# Patient Record
Sex: FEMALE | Race: WHITE | NOT HISPANIC OR LATINO | URBAN - METROPOLITAN AREA
[De-identification: names, ages, dates, MRNs, and addresses within clinical notes are randomized per-mention and may not be internally consistent; named-entity substitution may affect disease eponyms.]

---

## 2022-12-01 ENCOUNTER — EMERGENCY (EMERGENCY)
Facility: HOSPITAL | Age: 67
LOS: 0 days | Discharge: LEFT BEFORE DISCHARGE | End: 2022-12-01
Attending: EMERGENCY MEDICINE | Admitting: EMERGENCY MEDICINE

## 2022-12-01 VITALS
RESPIRATION RATE: 16 BRPM | WEIGHT: 145.06 LBS | SYSTOLIC BLOOD PRESSURE: 134 MMHG | TEMPERATURE: 97 F | DIASTOLIC BLOOD PRESSURE: 74 MMHG | HEART RATE: 110 BPM | OXYGEN SATURATION: 100 %

## 2022-12-01 DIAGNOSIS — R55 SYNCOPE AND COLLAPSE: ICD-10-CM
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
## 2022-12-01 DIAGNOSIS — Z53.29 PROCEDURE AND TREATMENT NOT CARRIED OUT BECAUSE OF PATIENT'S DECISION FOR OTHER REASONS: ICD-10-CM

## 2022-12-01 DIAGNOSIS — M25.561 PAIN IN RIGHT KNEE: ICD-10-CM
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
## 2022-12-01 DIAGNOSIS — S05.11XA CONTUSION OF EYEBALL AND ORBITAL TISSUES, RIGHT EYE, INITIAL ENCOUNTER: ICD-10-CM

## 2022-12-01 DIAGNOSIS — S02.2XXA FRACTURE OF NASAL BONES, INITIAL ENCOUNTER FOR CLOSED FRACTURE: ICD-10-CM

## 2022-12-01 DIAGNOSIS — J34.89 OTHER SPECIFIED DISORDERS OF NOSE AND NASAL SINUSES: ICD-10-CM

## 2022-12-01 DIAGNOSIS — R91.1 SOLITARY PULMONARY NODULE: ICD-10-CM

## 2022-12-01 LAB
ALBUMIN SERPL ELPH-MCNC: 4.5 G/DL — SIGNIFICANT CHANGE UP (ref 3.5–5.2)
ALP SERPL-CCNC: 73 U/L — SIGNIFICANT CHANGE UP (ref 30–115)
ALT FLD-CCNC: 25 U/L — SIGNIFICANT CHANGE UP (ref 0–41)
ANION GAP SERPL CALC-SCNC: 14 MMOL/L — SIGNIFICANT CHANGE UP (ref 7–14)
AST SERPL-CCNC: 27 U/L — SIGNIFICANT CHANGE UP (ref 0–41)
BASOPHILS # BLD AUTO: 0.03 K/UL — SIGNIFICANT CHANGE UP (ref 0–0.2)
BASOPHILS NFR BLD AUTO: 0.4 % — SIGNIFICANT CHANGE UP (ref 0–1)
BILIRUB SERPL-MCNC: 0.3 MG/DL — SIGNIFICANT CHANGE UP (ref 0.2–1.2)
BUN SERPL-MCNC: 17 MG/DL — SIGNIFICANT CHANGE UP (ref 10–20)
CALCIUM SERPL-MCNC: 9 MG/DL — SIGNIFICANT CHANGE UP (ref 8.4–10.5)
CHLORIDE SERPL-SCNC: 102 MMOL/L — SIGNIFICANT CHANGE UP (ref 98–110)
CO2 SERPL-SCNC: 24 MMOL/L — SIGNIFICANT CHANGE UP (ref 17–32)
CREAT SERPL-MCNC: 0.7 MG/DL — SIGNIFICANT CHANGE UP (ref 0.7–1.5)
EGFR: 95 ML/MIN/1.73M2 — SIGNIFICANT CHANGE UP
EOSINOPHIL # BLD AUTO: 0 K/UL — SIGNIFICANT CHANGE UP (ref 0–0.7)
EOSINOPHIL NFR BLD AUTO: 0 % — SIGNIFICANT CHANGE UP (ref 0–8)
GLUCOSE SERPL-MCNC: 113 MG/DL — HIGH (ref 70–99)
HCT VFR BLD CALC: 39.8 % — SIGNIFICANT CHANGE UP (ref 37–47)
HGB BLD-MCNC: 12.9 G/DL — SIGNIFICANT CHANGE UP (ref 12–16)
IMM GRANULOCYTES NFR BLD AUTO: 0.1 % — SIGNIFICANT CHANGE UP (ref 0.1–0.3)
LYMPHOCYTES # BLD AUTO: 0.84 K/UL — LOW (ref 1.2–3.4)
LYMPHOCYTES # BLD AUTO: 11.1 % — LOW (ref 20.5–51.1)
MCHC RBC-ENTMCNC: 28.2 PG — SIGNIFICANT CHANGE UP (ref 27–31)
MCHC RBC-ENTMCNC: 32.4 G/DL — SIGNIFICANT CHANGE UP (ref 32–37)
MCV RBC AUTO: 87.1 FL — SIGNIFICANT CHANGE UP (ref 81–99)
MONOCYTES # BLD AUTO: 0.6 K/UL — SIGNIFICANT CHANGE UP (ref 0.1–0.6)
MONOCYTES NFR BLD AUTO: 7.9 % — SIGNIFICANT CHANGE UP (ref 1.7–9.3)
NEUTROPHILS # BLD AUTO: 6.1 K/UL — SIGNIFICANT CHANGE UP (ref 1.4–6.5)
NEUTROPHILS NFR BLD AUTO: 80.5 % — HIGH (ref 42.2–75.2)
NRBC # BLD: 0 /100 WBCS — SIGNIFICANT CHANGE UP (ref 0–0)
NT-PROBNP SERPL-SCNC: 176 PG/ML — SIGNIFICANT CHANGE UP (ref 0–300)
PLATELET # BLD AUTO: 209 K/UL — SIGNIFICANT CHANGE UP (ref 130–400)
POTASSIUM SERPL-MCNC: 4.3 MMOL/L — SIGNIFICANT CHANGE UP (ref 3.5–5)
POTASSIUM SERPL-SCNC: 4.3 MMOL/L — SIGNIFICANT CHANGE UP (ref 3.5–5)
PROT SERPL-MCNC: 7.3 G/DL — SIGNIFICANT CHANGE UP (ref 6–8)
RBC # BLD: 4.57 M/UL — SIGNIFICANT CHANGE UP (ref 4.2–5.4)
RBC # FLD: 13.7 % — SIGNIFICANT CHANGE UP (ref 11.5–14.5)
SODIUM SERPL-SCNC: 140 MMOL/L — SIGNIFICANT CHANGE UP (ref 135–146)
TROPONIN T SERPL-MCNC: <0.01 NG/ML — SIGNIFICANT CHANGE UP
WBC # BLD: 7.58 K/UL — SIGNIFICANT CHANGE UP (ref 4.8–10.8)
WBC # FLD AUTO: 7.58 K/UL — SIGNIFICANT CHANGE UP (ref 4.8–10.8)

## 2022-12-01 PROCEDURE — 93010 ELECTROCARDIOGRAM REPORT: CPT

## 2022-12-01 PROCEDURE — 70486 CT MAXILLOFACIAL W/O DYE: CPT | Mod: 26,MA

## 2022-12-01 PROCEDURE — 71045 X-RAY EXAM CHEST 1 VIEW: CPT | Mod: 26

## 2022-12-01 PROCEDURE — 99285 EMERGENCY DEPT VISIT HI MDM: CPT | Mod: FS

## 2022-12-01 PROCEDURE — 73560 X-RAY EXAM OF KNEE 1 OR 2: CPT | Mod: 26,50

## 2022-12-01 PROCEDURE — 70450 CT HEAD/BRAIN W/O DYE: CPT | Mod: 26,MA

## 2022-12-01 PROCEDURE — 72125 CT NECK SPINE W/O DYE: CPT | Mod: 26,MA

## 2022-12-01 NOTE — ED PROVIDER NOTE - NS ED ATTENDING STATEMENT MOD
This was a shared visit with the J[facility]. I reviewed and verified the documentation and independently performed the documented:

## 2022-12-01 NOTE — ED PROVIDER NOTE - CLINICAL SUMMARY MEDICAL DECISION MAKING FREE TEXT BOX
Patient presents after a syncopal episode this morning. Sent by urgent care for evaluation. labs, ekg, xray, ct done. + nasal fx. + pulmonary nodule. Results discussed with patient. Patient advised to stay for further evaluation of syncope, does not want to stay at this time. Risks discussed, patient signed out ama. Return precautions discussed. Understands to follow up with ENT and pmd.

## 2022-12-01 NOTE — ED PROVIDER NOTE - NSFOLLOWUPINSTRUCTIONS_ED_ALL_ED_FT
Please follow up with cardiology and ENT in 1-3 days     Our Emergency Department Referral Coordinators will be reaching out to you in the next 24-48 hours from 9:00am to 5:00pm with a follow up appointment. Please expect a phone call from the hospital in that time frame. If you do not receive a call or if you have any questions or concerns, you can reach them at [phone] or [phone].       Nasal Fracture      A nasal fracture is a break or crack in the bones of the nose or the tissue that helps to form the nose (cartilage). Minor breaks do not require treatment and usually heal on their own after about one month. Serious breaks may require treatment that could include surgery.      What are the causes?    A nasal fracture is usually caused by the strong force of a direct hit to the nose (blunt injury). This type of injury often occurs from:  •Playing a contact sport.      •Being involved in a car accident.      •Falling.      •Getting punched in the face.        What are the signs or symptoms?    Symptoms of this condition include:  •Pain.      •Swelling of the nose.      •Bleeding from the nose.      •Bruising around the nose or bruising around the eyes (black eyes).      •Crooked appearance of the nose.        How is this diagnosed?    This condition may be diagnosed based on a physical exam. During the exam, the health care provider will:  •Gently feel the nose for signs of broken bones.      •Look inside the nostrils to check if there is a blood-filled swelling on the dividing wall between the nostrils (septal hematoma).      An X-ray of the nose may be taken. Sometimes, an X-ray may not show a nasal fracture even when one is present. In some cases, X-rays or a CT scan may be taken again 1–5 days later after the swelling has gone down.      How is this treated?    Treatment for this condition depends on the severity of the injury.  •Minor fractures that have not caused deformity often do not require treatment. They may heal on their own.    •For more serious fractures that have caused bones to move out of position, treatment may involve one of the following:  •Repositioning the bones without surgery. The health care provider may be able to do this in his or her office after you are given medicine to numb the nasal area (local anesthetic).      •Surgery. If surgery is needed, it will be done after the swelling is gone. Surgery will stabilize and align the fracture.          Follow these instructions at home:      Bag of ice on a towel on the skin.        The correct and incorrect way to hold your head and fingers for first aid during a nosebleed.     Activity     •Return to your normal activities as told by your health care provider. Ask your health care provider what activities are safe for you.      • Do not play contact sports for 3–4 weeks or as told by your health care provider.      Managing pain and swelling     If directed, put ice on the injured area. To do this:  •Put ice in a plastic bag.      •Place a towel between your skin and the bag.      •Leave the ice on for 20 minutes, 2–3 times a day.      •Take off the ice if your skin turns bright red. This is very important. If you cannot feel pain, heat, or cold, you have a greater risk of damage to the area.      General instructions    •Take over-the-counter and prescription medicines only as told by your health care provider.      •If your nose starts to bleed, sit in an upright position while you squeeze the soft parts of your nose against the dividing wall between your nostrils (septum) for 10 minutes.      •Try to avoid blowing your nose.      •Keep all follow-up visits. This is important.        Contact a health care provider if:    •Your pain increases or becomes severe.      •You continue to have nosebleeds.      •The shape of your nose does not return to normal within 5 days.      •You have pus draining out of your nose.        Get help right away if:    •You have bleeding from your nose that does not stop after you pinch your nostrils closed for 20 minutes and keep ice on your nose.      •You have clear fluid draining out of your nose.      •You notice swelling near the septum inside the nose. This swelling is a septal hematoma that must be drained to help prevent infection.      •You have difficulty moving your eyes.      •You have repeated vomiting.      These symptoms may be an emergency. Get help right away. Call 911.   • Do not wait to see if the symptoms will go away.       • Do not drive yourself to the hospital.         Summary    •A nasal fracture is a break or crack in the bones or cartilage of the nose.      •The fracture is usually caused by a blunt injury to the nose.      •Symptoms include pain, swelling, and facial bruising.      •Nasal fractures may heal on their own, or your health care provider may need to move the bones back into proper position. In some cases, surgery may be needed.

## 2022-12-01 NOTE — ED PROVIDER NOTE - PATIENT PORTAL LINK FT
You can access the FollowMyHealth Patient Portal offered by [facility] by registering at the following website: [email or web address]. By joining [email or web address]’s FollowMyHealth portal, you will also be able to view your health information using other applications (apps) compatible with our system.

## 2022-12-01 NOTE — ED ADULT NURSE NOTE - OBJECTIVE STATEMENT
66 y/o female presents to ED s/p syncopal episode after getting out of bed this morning. pt c/o pain and small lac to nose upon arrival

## 2022-12-01 NOTE — ED PROVIDER NOTE - NS ED ROS FT
Constitutional: (-) fever, (-) chills  Eyes: (-) visual changes  ENT: (-) nasal congestions, (+) nose pain   Cardiovascular: (-) chest pain, (-) syncope  Respiratory: (-) cough, (-) shortness of breath, (-) dyspnea,   Gastrointestinal: (-) vomiting, (-) diarrhea, (-)nausea,  Musculoskeletal: (-) neck pain, (-) back pain, (-) joint pain,  Integumentary: (-) rash, (-) edema, (-) bruises  Neurological: (-) headache, (-) loc, (-) dizziness, (-) tingling, (-)numbness,  Peripheral Vascular: (-) leg swelling  :  (-)dysuria,  (-) hematuria  Allergic/Immunologic: (-) pruritus

## 2022-12-01 NOTE — ED PROVIDER NOTE - CARE PROVIDER_API CALL
Ashutosh Hollingsworth)  Otolaryngology  [address] [place] [address], [address] [place] [address]  Phone: [phone]  Fax: [phone]  Follow Up Time:

## 2022-12-01 NOTE — ED PROVIDER NOTE - PHYSICAL EXAMINATION
VITAL SIGNS: I have reviewed nursing notes and confirm.  CONSTITUTIONAL: in no acute distress.  SKIN: Skin exam is warm and dry, no acute rash.  HEAD: Normocephalic; swelling and erythema to bridge of nose, ecchymosis to right eye  EYES: PERRL, EOM intact; conjunctiva and sclera clear.  ENT: No nasal discharge; airway clear.   NECK: Supple; non tender.  CARD: S1, S2 normal; no murmurs, gallops, or rubs. Regular rate and rhythm.  RESP: No wheezes, rales or rhonchi. Speaking in full sentences.   ABD: Normal bowel sounds; soft; non-distended; non-tender; No rebound or guarding. No CVA tenderness.  EXT: Normal ROM. No clubbing, cyanosis or edema.  NEURO: Alert, oriented. Grossly unremarkable. No focal deficits. steady gait   PSYCH: Cooperative, appropriate.

## 2022-12-01 NOTE — ED ADULT TRIAGE NOTE - CHIEF COMPLAINT QUOTE
pt states she had an episode of syncope this morning after getting out of bed. pt reports she walked downstairs to her kitchen, and only remembers waking up on the floor. pt c/o pain and laceration to the bridge of her nose. denies A/C use

## 2022-12-01 NOTE — ED PROVIDER NOTE - OBJECTIVE STATEMENT
67-year-old female no history presents to ED for syncopal episode around 6 AM this morning.  Patient states she got up out of bed and went outside to kitchen where she blacked out.  Patient states she woke up with some pain to her nose right knee and right eye.  She had 1 episode like this about 12 years ago.  Patient has had no recent cardiac work-up.  Patient came to ED because family forced her to go.  Denies chest pain, SOB, back pain, neck pain, N, V, blurry vision, headache, numbness tingling to extremities

## 2022-12-01 NOTE — ED PROVIDER NOTE - ATTENDING APP SHARED VISIT CONTRIBUTION OF CARE
66 yo F no pmh presents after a syncopal episode. States that this morning around 6am went to her kitchen. States that she remembers turning on the light then remembers being on the floor. Fell forward onto her face and right knee. + epistaxis at that time resolved. Now having some facial pain and swelling and right knee pain and swelling. no headache, no dizziness, no ac, no n/v. no chest pain, no shortness of breath, no palpitations. no abdominal pain. Similar episode 12 year ago. Does report recent URI and worsening cough, no fevers.     CONSTITUTIONAL: Well-developed; well-nourished; in no acute distress.   SKIN: warm, dry  HEAD: Normocephalic; atraumatic.  EYES: PERRL, EOMI, no conjunctival erythema  ENT: No nasal discharge; airway clear. no septal hematoma. + edema and tenderness to the nasal bridge. no active epistaxis.   NECK: Supple; non tender.  CARD: S1, S2 normal;  Regular rate and rhythm.   RESP: No wheezes, rales or rhonchi.  ABD: soft non tender, non distended, no rebound or guarding  EXT: Normal ROM.  5/5 strength in all 4 extremities. + edema and tenderness to the anterior right knee.   LYMPH: No acute cervical adenopathy.  NEURO: Alert, oriented, grossly unremarkable. neurovascularly intact  PSYCH: Cooperative, appropriate.

## 2022-12-03 ENCOUNTER — EMERGENCY (EMERGENCY)
Facility: HOSPITAL | Age: 67
LOS: 0 days | Discharge: HOME | End: 2022-12-03
Attending: EMERGENCY MEDICINE | Admitting: EMERGENCY MEDICINE

## 2022-12-03 VITALS
OXYGEN SATURATION: 96 % | HEART RATE: 84 BPM | SYSTOLIC BLOOD PRESSURE: 156 MMHG | TEMPERATURE: 98 F | RESPIRATION RATE: 16 BRPM | DIASTOLIC BLOOD PRESSURE: 72 MMHG

## 2022-12-03 DIAGNOSIS — R42 DIZZINESS AND GIDDINESS: ICD-10-CM

## 2022-12-03 DIAGNOSIS — U07.1 COVID-19: ICD-10-CM

## 2022-12-03 DIAGNOSIS — R00.1 BRADYCARDIA, UNSPECIFIED: ICD-10-CM

## 2022-12-03 LAB
ALBUMIN SERPL ELPH-MCNC: 4.4 G/DL — SIGNIFICANT CHANGE UP (ref 3.5–5.2)
ALP SERPL-CCNC: 76 U/L — SIGNIFICANT CHANGE UP (ref 30–115)
ALT FLD-CCNC: 35 U/L — SIGNIFICANT CHANGE UP (ref 0–41)
ANION GAP SERPL CALC-SCNC: 10 MMOL/L — SIGNIFICANT CHANGE UP (ref 7–14)
AST SERPL-CCNC: 30 U/L — SIGNIFICANT CHANGE UP (ref 0–41)
BASOPHILS # BLD AUTO: 0.03 K/UL — SIGNIFICANT CHANGE UP (ref 0–0.2)
BASOPHILS NFR BLD AUTO: 0.6 % — SIGNIFICANT CHANGE UP (ref 0–1)
BILIRUB SERPL-MCNC: 0.3 MG/DL — SIGNIFICANT CHANGE UP (ref 0.2–1.2)
BUN SERPL-MCNC: 11 MG/DL — SIGNIFICANT CHANGE UP (ref 10–20)
CALCIUM SERPL-MCNC: 8.5 MG/DL — SIGNIFICANT CHANGE UP (ref 8.4–10.5)
CHLORIDE SERPL-SCNC: 101 MMOL/L — SIGNIFICANT CHANGE UP (ref 98–110)
CO2 SERPL-SCNC: 25 MMOL/L — SIGNIFICANT CHANGE UP (ref 17–32)
CREAT SERPL-MCNC: 0.6 MG/DL — LOW (ref 0.7–1.5)
EGFR: 98 ML/MIN/1.73M2 — SIGNIFICANT CHANGE UP
EOSINOPHIL # BLD AUTO: 0 K/UL — SIGNIFICANT CHANGE UP (ref 0–0.7)
EOSINOPHIL NFR BLD AUTO: 0 % — SIGNIFICANT CHANGE UP (ref 0–8)
FLUAV AG NPH QL: SIGNIFICANT CHANGE UP
FLUBV AG NPH QL: SIGNIFICANT CHANGE UP
GLUCOSE SERPL-MCNC: 95 MG/DL — SIGNIFICANT CHANGE UP (ref 70–99)
HCG SERPL QL: NEGATIVE — SIGNIFICANT CHANGE UP
HCT VFR BLD CALC: 38.5 % — SIGNIFICANT CHANGE UP (ref 37–47)
HGB BLD-MCNC: 12.6 G/DL — SIGNIFICANT CHANGE UP (ref 12–16)
IMM GRANULOCYTES NFR BLD AUTO: 0 % — LOW (ref 0.1–0.3)
LYMPHOCYTES # BLD AUTO: 0.71 K/UL — LOW (ref 1.2–3.4)
LYMPHOCYTES # BLD AUTO: 13.9 % — LOW (ref 20.5–51.1)
MAGNESIUM SERPL-MCNC: 1.9 MG/DL — SIGNIFICANT CHANGE UP (ref 1.8–2.4)
MCHC RBC-ENTMCNC: 28.1 PG — SIGNIFICANT CHANGE UP (ref 27–31)
MCHC RBC-ENTMCNC: 32.7 G/DL — SIGNIFICANT CHANGE UP (ref 32–37)
MCV RBC AUTO: 85.7 FL — SIGNIFICANT CHANGE UP (ref 81–99)
MONOCYTES # BLD AUTO: 0.41 K/UL — SIGNIFICANT CHANGE UP (ref 0.1–0.6)
MONOCYTES NFR BLD AUTO: 8 % — SIGNIFICANT CHANGE UP (ref 1.7–9.3)
NEUTROPHILS # BLD AUTO: 3.97 K/UL — SIGNIFICANT CHANGE UP (ref 1.4–6.5)
NEUTROPHILS NFR BLD AUTO: 77.5 % — HIGH (ref 42.2–75.2)
NRBC # BLD: 0 /100 WBCS — SIGNIFICANT CHANGE UP (ref 0–0)
PLATELET # BLD AUTO: 206 K/UL — SIGNIFICANT CHANGE UP (ref 130–400)
POTASSIUM SERPL-MCNC: 4.1 MMOL/L — SIGNIFICANT CHANGE UP (ref 3.5–5)
POTASSIUM SERPL-SCNC: 4.1 MMOL/L — SIGNIFICANT CHANGE UP (ref 3.5–5)
PROT SERPL-MCNC: 6.9 G/DL — SIGNIFICANT CHANGE UP (ref 6–8)
RBC # BLD: 4.49 M/UL — SIGNIFICANT CHANGE UP (ref 4.2–5.4)
RBC # FLD: 13.6 % — SIGNIFICANT CHANGE UP (ref 11.5–14.5)
RSV RNA NPH QL NAA+NON-PROBE: SIGNIFICANT CHANGE UP
SARS-COV-2 RNA SPEC QL NAA+PROBE: DETECTED
SODIUM SERPL-SCNC: 136 MMOL/L — SIGNIFICANT CHANGE UP (ref 135–146)
TROPONIN T SERPL-MCNC: <0.01 NG/ML — SIGNIFICANT CHANGE UP
WBC # BLD: 5.12 K/UL — SIGNIFICANT CHANGE UP (ref 4.8–10.8)
WBC # FLD AUTO: 5.12 K/UL — SIGNIFICANT CHANGE UP (ref 4.8–10.8)

## 2022-12-03 PROCEDURE — 99285 EMERGENCY DEPT VISIT HI MDM: CPT | Mod: CS,GC

## 2022-12-03 PROCEDURE — 93010 ELECTROCARDIOGRAM REPORT: CPT

## 2022-12-03 RX ORDER — SODIUM CHLORIDE 9 MG/ML
2000 INJECTION, SOLUTION INTRAVENOUS ONCE
Refills: 0 | Status: COMPLETED | OUTPATIENT
Start: 2022-12-03 | End: 2022-12-03

## 2022-12-03 RX ORDER — METOCLOPRAMIDE HCL 10 MG
10 TABLET ORAL ONCE
Refills: 0 | Status: COMPLETED | OUTPATIENT
Start: 2022-12-03 | End: 2022-12-03

## 2022-12-03 RX ORDER — MECLIZINE HCL 12.5 MG
1 TABLET ORAL
Qty: 20 | Refills: 0
Start: 2022-12-03 | End: 2022-12-12

## 2022-12-03 RX ORDER — MECLIZINE HCL 12.5 MG
50 TABLET ORAL ONCE
Refills: 0 | Status: COMPLETED | OUTPATIENT
Start: 2022-12-03 | End: 2022-12-03

## 2022-12-03 RX ADMIN — Medication 104 MILLIGRAM(S): at 11:43

## 2022-12-03 RX ADMIN — SODIUM CHLORIDE 2000 MILLILITER(S): 9 INJECTION, SOLUTION INTRAVENOUS at 11:44

## 2022-12-03 RX ADMIN — Medication 50 MILLIGRAM(S): at 11:43

## 2022-12-03 NOTE — ED PROVIDER NOTE - CARE PROVIDERS DIRECT ADDRESSES
[email or web address]

## 2022-12-03 NOTE — ED PROVIDER NOTE - CLINICAL SUMMARY MEDICAL DECISION MAKING FREE TEXT BOX
Patient presented with 2 days of dizziness described as vertigo, causing her to fall. States she fell 2 days ago and sustained a fractured nose and R black eye but no other injuries per CTs performed that day. States the dizziness persisted so she came to the ED. Otherwise on arrival patient afebrile, HD stable, neurovascularly intact. States that upon arrival to ED the dizziness has subsided but it comes and goes. Obtained labs which were grossly unremarkable including no significant leukocytosis, anemia, signs of dehydration/ARIEL, transaminitis or significant electrolyte abnormalities. EKG non-ischemic. Patient asymptomatic after tx in ED, ambulatory without difficulty, tolerating PO. Given the above, will discharge home with outpatient follow up. Patient agreeable with plan. Agrees to return to ED for any new or worsening symptoms.

## 2022-12-03 NOTE — ED PROVIDER NOTE - PHYSICAL EXAMINATION
CONSTITUTIONAL: nontoxic appearing, in no acute distress  HEAD:  normocephalic, R periorbital ecchymosis; nasal bridge swollen w/ ecchymosis; no open lac   EYES:  no conjunctival injection, no eye discharge, tracking well  ENT:  tympanic membranes intact bilaterally, moist mucous membranes, no oropharyngeal ulcerations or lesions, no tonsillar swelling or erythema, no tonsillar exudates  NECK:  supple, no masses, no tender anterior/posterior cervical lymphadenopathy  CV:  regular rate and rhythm, cap refill < 2 seconds  RESP:  normal respiratory effort, lungs clear to auscultation bilaterally, no wheezes, no crackles, no retractions, no stridor  ABD:  soft, nontender, nondistended, no masses, no organomegaly  LYMPH:  no significant lymphadenopathy  MSK/NEURO:  normal movement, normal tone, nonfocal; HINTS neg for central causes  SKIN:  warm, dry, no rash

## 2022-12-03 NOTE — ED PROVIDER NOTE - PROGRESS NOTE DETAILS
pt ambulated independently in ED w/o complication; repeat HINTS neg for central causes; pt denies dizziness on ambulation or movement. The patient / caregiver given detailed return precautions and advised to return to the emergency department if any new symptoms developed, symptoms worsened or for any concerns. The patient / caregiver offered the opportunity to ask questions and verbalized that they understand the diagnosis and discharge instructions.

## 2022-12-03 NOTE — ED PROVIDER NOTE - CARE PROVIDER_API CALL
Tarsa Blevins)  Neurology  [facility] [facility] [facility], [address]  [place] [address]  Phone: [phone]  Fax: [phone]  Follow Up Time: Routine    Ashutosh Hollingsworth)  Otolaryngology  [address], [address]nd [place] [address]  Phone: [phone]  Fax: [phone]  Follow Up Time: Routine

## 2022-12-03 NOTE — ED PROVIDER NOTE - OBJECTIVE STATEMENT
67 y F no pmhx c/o dizziness. of note pt was seen here 2 d ago for dizziness and fall. pt suffered a fractured nose and R black eye. since then pt has had no issues until this morning. pt woke up and felt well but when she got up to go to the bathroom she felt dizzy. pt stayed in bed and called ems. pt presents to ED w/o dizziness as it resolved pta; however states dizziness comes and goes w/ certain head movements. denies n/v/f/sob/cp/back pain/abd pain/ HA

## 2022-12-03 NOTE — ED PROVIDER NOTE - PATIENT PORTAL LINK FT
You can access the FollowMyHealth Patient Portal offered by [facility] by registering at the following website: [email or web address]. By joining [employer]’s FollowMyHealth portal, you will also be able to view your health information using other applications (apps) compatible with our system.

## 2022-12-03 NOTE — ED ADULT TRIAGE NOTE - CHIEF COMPLAINT QUOTE
BIBA from home after feeling dizzy after getting out of bed this morning. pt denies falling, states she laid back in bed until EMS arrived. pt has been taking azithromycin and benzoate for URI symptoms

## 2022-12-03 NOTE — ED PROVIDER NOTE - PROVIDER TOKENS
PROVIDER:[TOKEN:[[id number]],FOLLOWUP:[Routine]],PROVIDER:[TOKEN:[[id number]],FOLLOWUP:[Routine]]

## 2022-12-03 NOTE — ED PROVIDER NOTE - NSFOLLOWUPINSTRUCTIONS_ED_ALL_ED_FT
Benign Paroxysmal Positional Vertigo    WHAT YOU NEED TO KNOW:    BPPV is an inner ear condition that causes you to suddenly feel dizzy. Benign means it is not serious or life-threatening. BPPV is caused by a problem with the nerves and structure of your inner ear. BPPV happens when small pieces of calcium break loose and lump together in one of your inner ear canals.   Ear Anatomy     DISCHARGE INSTRUCTIONS:    Return to the emergency department if:   •You fall during a BPPV episode and are injured.    •You have a severe headache that does not go away.    •You have new changes in your vision or feel weak or confused.    •You have problems hearing, or you have ringing or buzzing in your ears.    Contact your healthcare provider if:   •Your BPPV symptoms do not go away or they return.    •You have problems with your balance, or you are falling often.    •You have new or increased nausea or vomiting with vertigo.    •You feel anxious or depressed and do not want to leave your home.    •You have questions or concerns about your condition or care.    Medicines:   •Medicines may be recommended or prescribed to treat dizziness or nausea.    •Take your medicine as directed. Contact your healthcare provider if you think your medicine is not helping or if you have side effects. Tell your provider if you are allergic to any medicine. Keep a list of the medicines, vitamins, and herbs you take. Include the amounts, and when and why you take them. Bring the list or the pill bottles to follow-up visits. Carry your medicine list with you in case of an emergency.    Prevent your symptoms:   •Try to avoid sudden head movements. Stand up and lie down slowly.     •Raise and support your head when you lie down. Place pillows under your upper back and head or rest in a recliner.     •Change your position often when you are lying down. Try not to lie with your head on the same side for long periods of time. Roll over slowly.     •Wear protective gear when you ride a bike or play sports. A helmet helps protect your head from injury.    Follow up with your healthcare provider as directed: You may need to return in 1 month to check the progress of your treatment. Write down your questions so you remember to ask them during your visits.